# Patient Record
Sex: FEMALE | Race: AMERICAN INDIAN OR ALASKA NATIVE | NOT HISPANIC OR LATINO | Employment: FULL TIME | URBAN - METROPOLITAN AREA
[De-identification: names, ages, dates, MRNs, and addresses within clinical notes are randomized per-mention and may not be internally consistent; named-entity substitution may affect disease eponyms.]

---

## 2023-08-19 ENCOUNTER — HOSPITAL ENCOUNTER (EMERGENCY)
Facility: CLINIC | Age: 33
Discharge: HOME | End: 2023-08-19
Payer: COMMERCIAL

## 2023-08-19 VITALS
TEMPERATURE: 98.5 F | HEART RATE: 138 BPM | OXYGEN SATURATION: 96 % | HEIGHT: 64 IN | BODY MASS INDEX: 35.85 KG/M2 | DIASTOLIC BLOOD PRESSURE: 94 MMHG | RESPIRATION RATE: 18 BRPM | WEIGHT: 210 LBS | SYSTOLIC BLOOD PRESSURE: 125 MMHG

## 2023-08-19 DIAGNOSIS — F10.220 ACUTE ALCOHOLIC INTOXICATION IN ALCOHOLISM WITHOUT COMPLICATION (H): ICD-10-CM

## 2023-08-19 DIAGNOSIS — S09.93XA FACIAL INJURY, INITIAL ENCOUNTER: ICD-10-CM

## 2023-08-19 PROCEDURE — 250N000013 HC RX MED GY IP 250 OP 250 PS 637

## 2023-08-19 PROCEDURE — 99283 EMERGENCY DEPT VISIT LOW MDM: CPT

## 2023-08-19 PROCEDURE — 250N000011 HC RX IP 250 OP 636

## 2023-08-19 RX ORDER — ONDANSETRON 4 MG/1
4 TABLET, ORALLY DISINTEGRATING ORAL ONCE
Status: COMPLETED | OUTPATIENT
Start: 2023-08-19 | End: 2023-08-19

## 2023-08-19 RX ORDER — OLANZAPINE 5 MG/1
5 TABLET, ORALLY DISINTEGRATING ORAL ONCE
Status: COMPLETED | OUTPATIENT
Start: 2023-08-19 | End: 2023-08-19

## 2023-08-19 RX ADMIN — ONDANSETRON 4 MG: 4 TABLET, ORALLY DISINTEGRATING ORAL at 15:34

## 2023-08-19 RX ADMIN — OLANZAPINE 5 MG: 5 TABLET, ORALLY DISINTEGRATING ORAL at 15:34

## 2023-08-19 ASSESSMENT — ENCOUNTER SYMPTOMS
EYE DISCHARGE: 0
EYE PAIN: 0
SINUS PAIN: 0
LIGHT-HEADEDNESS: 0
SPEECH DIFFICULTY: 0
FACIAL SWELLING: 0
CONFUSION: 0
SEIZURES: 0
WEAKNESS: 0
EYE REDNESS: 0
HEADACHES: 0
RHINORRHEA: 0
WOUND: 0
NERVOUS/ANXIOUS: 1
NECK PAIN: 0
FACIAL ASYMMETRY: 0
MYALGIAS: 0
DIZZINESS: 0
NUMBNESS: 0

## 2023-08-19 NOTE — Clinical Note
Clarissa Kirk was seen and treated in our emergency department on 8/19/2023.    She has been medically cleared for return to [facility].     Sincerely,     [facility] Emergency Dept

## 2023-08-19 NOTE — ED PROVIDER NOTES
"  History     Chief Complaint   Patient presents with    Fall     HPI  Clarissa Bradley is a 32 year old female who presents to ED via EMS from [facility] where she was presenting for intake for acute alcohol intoxication and detox and sustained a mechanical fall off of a curb presented striking the left side of her face.  No loss of consciousness endorsed.  Patient arrives denying pain, weakness, vision changes, headache, lightheadedness or dizziness, ear ringing, eye discharge, nosebleed, other pain or discomfort.  She is requesting to smoke a cigarette, declining cessation adjuvants.  Wishes to return to [facility] for detox.    Allergies:  No Known Allergies    Problem List:    There are no problems to display for this patient.       Past Medical History:    No past medical history on file.    Past Surgical History:    No past surgical history on file.    Family History:    No family history on file.    Social History:  Marital Status:          Medications:    No current outpatient medications on file.        Review of Systems   HENT:  Negative for dental problem, facial swelling, hearing loss, nosebleeds, rhinorrhea and sinus pain.    Eyes:  Negative for pain, discharge, redness and visual disturbance.   Musculoskeletal:  Negative for gait problem, myalgias and neck pain.   Skin:  Negative for wound.   Neurological:  Negative for dizziness, seizures, syncope, facial asymmetry, speech difficulty, weakness, light-headedness, numbness and headaches.   Psychiatric/Behavioral:  Negative for confusion. The patient is nervous/anxious.    All other systems reviewed and are negative.      Physical Exam   BP: (!) 125/94  Pulse: (!) 138  Temp: 98.5  F (36.9  C)  Resp: 18  Height: 162.6 cm (5' 4\")  Weight: 95.3 kg (210 lb)  SpO2: 96 %      Physical Exam  Vitals reviewed.   Constitutional:       General: She is not in acute distress.     Appearance: She is not toxic-appearing.   HENT:      " Head: Normocephalic and atraumatic.   Eyes:      General:         Left eye: No discharge.      Extraocular Movements: Extraocular movements intact.      Conjunctiva/sclera: Conjunctivae normal.      Pupils: Pupils are equal, round, and reactive to light.      Comments: Small, non open, minor abrasion to infraorbital cheek bone below LEFT eye without conjunctival injection, eye discharge, or vision changes.   Cardiovascular:      Rate and Rhythm: Regular rhythm. Tachycardia present.   Pulmonary:      Effort: Pulmonary effort is normal. No respiratory distress.   Abdominal:      Palpations: Abdomen is soft.      Tenderness: There is no abdominal tenderness.   Musculoskeletal:      Cervical back: Normal range of motion and neck supple. No tenderness.   Skin:     General: Skin is warm and dry.      Capillary Refill: Capillary refill takes less than 2 seconds.      Coloration: Skin is not jaundiced.   Neurological:      General: No focal deficit present.      Mental Status: She is alert.      Motor: No weakness.      Coordination: Coordination normal.      Gait: Gait normal.   Psychiatric:         Behavior: Behavior normal.         Thought Content: Thought content normal.         ED Course        No results found for this or any previous visit (from the past 24 hour(s)).    Medications   ondansetron (ZOFRAN ODT) ODT tab 4 mg (has no administration in time range)       Assessments & Plan (with Medical Decision Making)  Clarissa is a pleasant 32-year-old female with no pertinent past medical history who presents by EMS directly from [place] where she arrived for an intake for acute alcohol intoxication and subsequently had a mechanical fall striking the left infraorbital region of her cheekbone on the ground.  No loss of consciousness.  Patient denies vision changes, severe headache, dizziness or lightheadedness.  Her physical exam is unremarkable and there is just a very minor, not on bleeding abrasion to the lower left  inferior orbital rim with no tenderness to palpation and no crepitus beneath the skin.  Sclera is not injected and free from discharge, no hyphema or pupillary changes.  Stable independent gait with a nonfocal neuro exam.  CTh considered but ultimately deferred considering and no external signs of significant mechanism of injury, normal exam as documented above and no concerning endorsements from patient.  Patient is clinically sober at this point with an unremarkable neuro and physical exam she is appropriate for discharge with transportation arranged to return to [facility].     I have reviewed the nursing notes.    I have reviewed the findings, diagnosis, plan and need for follow up with the patient.       [place] Head CT Rule  (calculator)  Background  Assesses need for head imaging in acute trauma  Only validated in adults with GCS 13-15 with witnessed LOC, amnesia to head injury or confusion  Data  32 year old  High Risk Criteria (major criteria)   Of 5 possible items  NEGATIVE    Moderate Risk Criteria (minor criteria)   Of 3 possible items  NEGATIVE    Interpretation  No indications for head imaging        Medical Decision Making  The patient's presentation was of moderate complexity (a chronic illness mild to moderate exacerbation, progression, or side effect of treatment).    The patient's evaluation involved:  strong consideration of a test (see separate area of note for details) that was ultimately deferred    The patient's management necessitated moderate risk (limitations due to social determinants of health (acute alcohol intoxication)).        New Prescriptions    No medications on file       Final diagnoses:   None     Cecilia Soto [facility]  Emergency Medicine Nurse Practitioner    8/19/2023   [facility] EMERGENCY DEPT       Soto, Cecilia, APRN CNP  08/19/23 [id number]

## 2023-08-19 NOTE — ED TRIAGE NOTES
"Pt presents from [facility] after slipping outside on edge of [facility] and \"buffed\" her face on the left upper cheek. Pt was wanting to have cigarette and [facility] staff refused to allow pt to do that. Pt was told by [facility] staff that she had to be seen at the hospital or kicked out of [facility]. Pt denies LOC, anticoagulant use.     Pt is intoxicated and blew a 0.373. Last drink 2 hours ago. Pt says spouse may be able to  pt.      Triage Assessment       Row Name 08/19/23 [id number]       Triage Assessment (Adult)    Airway WDL WDL       Respiratory WDL    Respiratory WDL WDL       Skin Circulation/Temperature WDL    Skin Circulation/Temperature WDL WDL       Cardiac WDL    Cardiac WDL WDL       Peripheral/Neurovascular WDL    Peripheral Neurovascular WDL WDL       Cognitive/Neuro/Behavioral WDL    Cognitive/Neuro/Behavioral WDL WDL                    "

## 2023-08-19 NOTE — Clinical Note
Clarissa Bradley was seen and treated in our emergency department on 8/19/2023.         Sincerely,     [facility] Emergency Dept

## 2023-08-19 NOTE — ED NOTES
Bed: ED05  Expected date: 8/19/23  Expected time: 2:52 PM  Means of arrival: Ambulance  Comments:  Fall, ETOH

## 2023-08-19 NOTE — DISCHARGE INSTRUCTIONS
Patient has been evaluated and examined by myself and is medically cleared for return to [facility].    Cecilia Soto, JONH FNP  Emergency Nurse Practitioner    Reasons to return to the ER include change in mental status, sudden severe headache, vomiting, change in vision, unstable gait, weakness, or paresthesias.

## 2024-03-10 ENCOUNTER — HEALTH MAINTENANCE LETTER (OUTPATIENT)
Age: 34
End: 2024-03-10

## 2025-03-16 ENCOUNTER — HEALTH MAINTENANCE LETTER (OUTPATIENT)
Age: 35
End: 2025-03-16